# Patient Record
Sex: FEMALE | Race: BLACK OR AFRICAN AMERICAN | NOT HISPANIC OR LATINO | Employment: UNEMPLOYED | URBAN - METROPOLITAN AREA
[De-identification: names, ages, dates, MRNs, and addresses within clinical notes are randomized per-mention and may not be internally consistent; named-entity substitution may affect disease eponyms.]

---

## 2018-01-01 ENCOUNTER — HOSPITAL ENCOUNTER (EMERGENCY)
Facility: HOSPITAL | Age: 68
Discharge: HOME | End: 2018-01-01
Attending: EMERGENCY MEDICINE
Payer: COMMERCIAL

## 2018-01-01 VITALS
RESPIRATION RATE: 18 BRPM | SYSTOLIC BLOOD PRESSURE: 180 MMHG | HEIGHT: 67 IN | DIASTOLIC BLOOD PRESSURE: 81 MMHG | OXYGEN SATURATION: 98 % | WEIGHT: 180 LBS | HEART RATE: 96 BPM | TEMPERATURE: 98 F | BODY MASS INDEX: 28.25 KG/M2

## 2018-01-01 DIAGNOSIS — Z76.0 MEDICATION REFILL: Primary | ICD-10-CM

## 2018-01-01 PROCEDURE — 99282 EMERGENCY DEPT VISIT SF MDM: CPT

## 2018-01-01 RX ORDER — VALSARTAN AND HYDROCHLOROTHIAZIDE 320; 25 MG/1; MG/1
1 TABLET, FILM COATED ORAL DAILY
COMMUNITY
End: 2018-05-18

## 2018-01-01 RX ORDER — VALSARTAN AND HYDROCHLOROTHIAZIDE 320; 25 MG/1; MG/1
1 TABLET, FILM COATED ORAL DAILY
Qty: 90 TABLET | Refills: 1 | Status: SHIPPED | OUTPATIENT
Start: 2018-01-01 | End: 2018-05-18

## 2018-01-01 RX ORDER — NIFEDIPINE 60 MG/1
30 TABLET, EXTENDED RELEASE ORAL DAILY
COMMUNITY
End: 2018-05-18

## 2018-01-01 RX ORDER — EZETIMIBE AND SIMVASTATIN 10; 10 MG/1; MG/1
1 TABLET ORAL NIGHTLY
Qty: 90 TABLET | Refills: 1 | Status: SHIPPED | OUTPATIENT
Start: 2018-01-01 | End: 2018-05-18

## 2018-01-01 RX ORDER — NIFEDIPINE 60 MG/1
60 TABLET, EXTENDED RELEASE ORAL DAILY
Qty: 30 TABLET | Refills: 1 | Status: SHIPPED | OUTPATIENT
Start: 2018-01-01 | End: 2018-05-18

## 2018-01-01 RX ORDER — NAPROXEN SODIUM 220 MG/1
81 TABLET, FILM COATED ORAL DAILY
Qty: 30 TABLET | Refills: 1 | COMMUNITY
Start: 2018-01-01 | End: 2018-05-18

## 2018-01-01 RX ORDER — DONEPEZIL HYDROCHLORIDE 10 MG/1
10 TABLET, FILM COATED ORAL NIGHTLY
COMMUNITY
End: 2018-05-18

## 2018-01-01 RX ORDER — NAPROXEN SODIUM 220 MG/1
81 TABLET, FILM COATED ORAL DAILY
COMMUNITY
End: 2018-05-18

## 2018-01-01 RX ORDER — DONEPEZIL HYDROCHLORIDE 10 MG/1
10 TABLET, FILM COATED ORAL NIGHTLY
Qty: 30 TABLET | Refills: 1 | Status: SHIPPED | OUTPATIENT
Start: 2018-01-01 | End: 2018-05-18

## 2018-01-02 NOTE — ED PROVIDER NOTES
Encounter Date: 1/1/2018       History     Chief Complaint   Patient presents with    Medication Refill     Pt is visiting from out of town, airline lost baggage with rx medications, needs to have meds filled as substitute.       Well-developed 59-year-old female flew into the airport but her luggage was lost.  Does not have her prescriptions.  Needs a medication refill for all.          Review of patient's allergies indicates:  No Known Allergies  Past Medical History:   Diagnosis Date    Hypertension      History reviewed. No pertinent surgical history.  History reviewed. No pertinent family history.  Social History   Substance Use Topics    Smoking status: Never Smoker    Smokeless tobacco: Never Used    Alcohol use No     Review of Systems   Constitutional: Negative.    HENT: Negative.    Respiratory: Negative.    Cardiovascular: Negative.    Genitourinary: Negative.    Musculoskeletal: Negative.    Skin: Negative.    All other systems reviewed and are negative.      Physical Exam     Initial Vitals [01/01/18 1710]   BP Pulse Resp Temp SpO2   (!) 180/81 96 18 98.4 °F (36.9 °C) 98 %      MAP       114         Physical Exam    Nursing note and vitals reviewed.  Constitutional: She appears well-developed and well-nourished.   HENT:   Head: Normocephalic.   Cardiovascular: Normal rate, regular rhythm and normal heart sounds.   Pulmonary/Chest: Breath sounds normal.   Abdominal: Soft.   Musculoskeletal: Normal range of motion.   Neurological: She is alert and oriented to person, place, and time. She has normal strength.   Skin: Skin is warm. Capillary refill takes less than 2 seconds.   Psychiatric: She has a normal mood and affect.         ED Course   Procedures  Labs Reviewed - No data to display          Medical Decision Making:   Differential Diagnosis:   Medication refill  ED Management:  I will refill this patient's medications to include Aricept, Vytorin, alalatcc, Diovan, and aspirin.                    ED Course      Clinical Impression:   The encounter diagnosis was Medication refill.    Disposition:   Disposition: Discharged  Condition: Stable                        Tucker Blanton MD  01/01/18 3353

## 2018-05-18 ENCOUNTER — HOSPITAL ENCOUNTER (EMERGENCY)
Facility: HOSPITAL | Age: 68
Discharge: HOME | End: 2018-05-18
Payer: COMMERCIAL

## 2018-05-18 VITALS
HEART RATE: 60 BPM | RESPIRATION RATE: 18 BRPM | HEIGHT: 67 IN | DIASTOLIC BLOOD PRESSURE: 77 MMHG | SYSTOLIC BLOOD PRESSURE: 176 MMHG | OXYGEN SATURATION: 98 % | BODY MASS INDEX: 27.78 KG/M2 | WEIGHT: 177 LBS | TEMPERATURE: 98 F

## 2018-05-18 DIAGNOSIS — Z76.0 PRESCRIPTION REFILL: Primary | ICD-10-CM

## 2018-05-18 PROCEDURE — 99283 EMERGENCY DEPT VISIT LOW MDM: CPT

## 2018-05-18 RX ORDER — NAPROXEN SODIUM 220 MG/1
81 TABLET, FILM COATED ORAL DAILY
Qty: 30 TABLET | Refills: 0 | COMMUNITY
Start: 2018-05-18

## 2018-05-18 RX ORDER — OXYBUTYNIN CHLORIDE 5 MG/1
5 TABLET ORAL 3 TIMES DAILY
COMMUNITY
End: 2018-05-18

## 2018-05-18 RX ORDER — EZETIMIBE AND SIMVASTATIN 10; 10 MG/1; MG/1
1 TABLET ORAL NIGHTLY
Qty: 30 TABLET | Refills: 0 | Status: SHIPPED | OUTPATIENT
Start: 2018-05-18 | End: 2019-05-18

## 2018-05-18 RX ORDER — DONEPEZIL HYDROCHLORIDE 10 MG/1
10 TABLET, FILM COATED ORAL NIGHTLY
Qty: 30 TABLET | Refills: 0 | Status: SHIPPED | OUTPATIENT
Start: 2018-05-18

## 2018-05-18 RX ORDER — NIFEDIPINE 60 MG/1
60 TABLET, EXTENDED RELEASE ORAL DAILY
Qty: 30 TABLET | Refills: 0 | Status: SHIPPED | OUTPATIENT
Start: 2018-05-18

## 2018-05-18 RX ORDER — VALSARTAN AND HYDROCHLOROTHIAZIDE 320; 25 MG/1; MG/1
1 TABLET, FILM COATED ORAL DAILY
Qty: 30 TABLET | Refills: 0 | Status: SHIPPED | OUTPATIENT
Start: 2018-05-18

## 2018-05-18 RX ORDER — OXYBUTYNIN CHLORIDE 5 MG/1
5 TABLET ORAL 3 TIMES DAILY
Qty: 90 TABLET | Refills: 0 | Status: SHIPPED | OUTPATIENT
Start: 2018-05-18

## 2018-05-18 NOTE — ED NOTES
Pt visiting from [facility] and out of all of medications. Pt denies pain. Pt out of BP meds. Pt not going back home unit August 30th.

## 2018-05-18 NOTE — DISCHARGE INSTRUCTIONS
Make an appointment with a local MD to avoid running out of medication. ER can't give multiple refills.

## 2023-03-17 NOTE — ED PROVIDER NOTES
Current Discharge Medication List       eMERGENCY dEPARTMENT eNCOUnter    CHIEF COMPLAINT    Chief Complaint   Patient presents with    Medication Reaction     Pt is from the [facility] nad ran out of medication on tuesday       HPI    Marilyn Brasher is a 67 y.o. female who presents to the ED for medication refills. Is visiting son from [place] [employer]. Here until August. She denies chest pain or trouble breathing. She is not having any pain. Son states they only give 30 days of Medicine and she has been here since April and is now out of all her meds.      CURRENT MEDICATIONS    No current facility-administered medications on file prior to encounter.      Current Outpatient Prescriptions on File Prior to Encounter   Medication Sig Dispense Refill    [DISCONTINUED] aspirin 81 MG Chew Take 81 mg by mouth once daily.      [DISCONTINUED] donepezil (ARICEPT) 10 MG tablet Take 10 mg by mouth every evening.      [DISCONTINUED] EZETIMIBE/SIMVASTATIN (VYTORIN 10-10 ORAL) Take by mouth.      [DISCONTINUED] NIFEdipine (ADALAT CC) 60 MG TbSR Take 30 mg by mouth once daily.      [DISCONTINUED] valsartan-hydrochlorothiazide (DIOVAN-HCT) 320-25 mg per tablet Take 1 tablet by mouth once daily.      [DISCONTINUED] aspirin 81 MG Chew Take 1 tablet (81 mg total) by mouth once daily. 30 tablet 1    [DISCONTINUED] donepezil (ARICEPT) 10 MG tablet Take 1 tablet (10 mg total) by mouth every evening. 30 tablet 1    [DISCONTINUED] ezetimibe-simvastatin 10-10 mg (VYTORIN) 10-10 mg per tablet Take 1 tablet by mouth every evening. 90 tablet 1    [DISCONTINUED] NIFEdipine (ADALAT CC) 60 MG TbSR Take 1 tablet (60 mg total) by mouth once daily. 30 tablet 1    [DISCONTINUED] valsartan-hydrochlorothiazide (DIOVAN-HCT) 320-25 mg per tablet Take 1 tablet by mouth once daily. 90 tablet 1         ALLERGIES    Review of patient's allergies indicates:  No Known Allergies    PAST MEDICAL HISTORY  Past Medical History:   Diagnosis Date    Dementia   "   High cholesterol     Hypertension     Urinary frequency        SURGICAL HISTORY    History reviewed. No pertinent surgical history.    SOCIAL HISTORY    Social History     Social History    Marital status: Single     Spouse name: N/A    Number of children: N/A    Years of education: N/A     Social History Main Topics    Smoking status: Never Smoker    Smokeless tobacco: Never Used    Alcohol use No    Drug use: No    Sexual activity: Not Asked     Other Topics Concern    None     Social History Narrative    None       FAMILY HISTORY    History reviewed. No pertinent family history.    REVIEW OF SYSTEMS   ROS  Constitutional:  No fever, chills, weight loss or weakness.   Eyes:  No  Photophobia, blurred vision or discharge.   HENT:  No ear pain, nasal congestion or sore throat..  Respiratory:  No cough, shortness of breath or wheezing.   Cardiovascular:  No chest pain, palpitations or swelling.   GI:  No abdominal pain, nausea, vomiting, or diarrhea.  : No dysuria, frequency   Musculoskeletal:  No back pain or neck pain.   Skin:  No reported rashes or infected lesions.   Neurologic:  No reported headache, focal weakness or sensory changes.     All Systems otherwise negative except as noted in the History of Present Illness.        PHYSICAL EXAM    Reviewed Triage Note  VITAL SIGNS: BP (!) 196/77   Pulse 73   Temp 97.7 °F (36.5 °C) (Oral)   Resp 18   Ht 5' 7" (1.702 m)   Wt 80.3 kg (177 lb)   SpO2 99%   Breastfeeding? No   BMI 27.72 kg/m²    Vitals:    05/18/18 1548   BP: (!) 196/77   Pulse: 73   Resp: 18   Temp: 97.7 °F (36.5 °C)       Physical Exam  Nursing Notes and Vital Signs Reviewed  Constitutional:  Well-developed, well-nourished, elderly female in NAD.  HENT:  Normocephalic, atraumatic. Bilateral external EACs normal, Bilateral TMs normal. Nose normal, no nasal mucosal edema, no rhinorrhea. Mouth mucus membranes P & M, no oral lesions. Oropharynx no erythema, edema or exudate, uvula " midline.   Eyes:  PERRL EOMI. Conjunctiva normal without discharge.   Neck: Normal range of motion. No midline tenderness or vertebral step-off. No stridor. No meningismus. No lymphadenopathy.   Respiratory:  Normal breath sounds bilaterally.  No respiratory distress, retractions, or conversational dyspnea. No wheezing. No rhonchi. No rales.   Cardiovascular:  Normal heart rate. Normal rhythm. No pitting lower extremity edema.   Integument:  Warm and dry. No rash. No petechiae  Neurologic:  Normal motor function. Normal sensory function. No focal deficits noted. Alert and Interactive.  Psychiatric:  Affect normal. Mood normal.         LABS  Pertinent labs reviewed. (See chart for details)           RADIOLOGY    Imaging Results    None         PROCEDURES    Procedures      EKG         ED COURSE & MEDICAL DECISION MAKING    Pertinent & Imaging studies reviewed. (See chart for details and specific orders.)  No chest pain or trouble breathing. Advised to establish care with a PCP here for continued refill. Return if concerns.     Medications - No data to display        FINAL IMPRESSION    1. Prescription refill        Differential Diagnosis: Hypertensive crisis                                       Chest pain    Patient advised to follow-up with PCP for re-check and BP re-check.                   Nora Bourne NP  05/18/18 5517     right atrial enlargement.